# Patient Record
Sex: MALE | Race: WHITE | NOT HISPANIC OR LATINO | URBAN - METROPOLITAN AREA
[De-identification: names, ages, dates, MRNs, and addresses within clinical notes are randomized per-mention and may not be internally consistent; named-entity substitution may affect disease eponyms.]

---

## 2017-02-17 ENCOUNTER — FOLLOW UP ESTABLISHED (OUTPATIENT)
Dept: URBAN - METROPOLITAN AREA CLINIC 51 | Facility: CLINIC | Age: 57
End: 2017-02-17
Payer: COMMERCIAL

## 2017-02-17 PROCEDURE — 92083 EXTENDED VISUAL FIELD XM: CPT | Performed by: OPTOMETRIST

## 2017-02-17 PROCEDURE — 92012 INTRM OPH EXAM EST PATIENT: CPT | Performed by: OPTOMETRIST

## 2017-02-17 RX ORDER — FLUOROMETHOLONE 1 MG/ML
0.1 % SUSPENSION/ DROPS OPHTHALMIC
Qty: 1 | Refills: 0 | Status: INACTIVE
Start: 2017-02-17 | End: 2017-03-10

## 2017-02-17 ASSESSMENT — INTRAOCULAR PRESSURE
OS: 21
OD: 24

## 2017-03-10 ENCOUNTER — FOLLOW UP ESTABLISHED (OUTPATIENT)
Dept: URBAN - METROPOLITAN AREA CLINIC 51 | Facility: CLINIC | Age: 57
End: 2017-03-10
Payer: COMMERCIAL

## 2017-03-10 DIAGNOSIS — H10.013 ACUTE FOLLICULAR CONJUNCTIVITIS, BILATERAL: Primary | ICD-10-CM

## 2017-03-10 PROCEDURE — 92012 INTRM OPH EXAM EST PATIENT: CPT | Performed by: OPTOMETRIST

## 2017-03-10 RX ORDER — LATANOPROST 50 UG/ML
0.005 % SOLUTION OPHTHALMIC
Qty: 3 | Refills: 3 | Status: INACTIVE
Start: 2017-03-10 | End: 2017-09-12

## 2017-03-10 RX ORDER — BRIMONIDINE TARTRATE 2 MG/ML
0.2 % SOLUTION/ DROPS OPHTHALMIC
Qty: 3 | Refills: 3 | Status: INACTIVE
Start: 2017-03-10 | End: 2017-09-12

## 2017-03-10 ASSESSMENT — INTRAOCULAR PRESSURE
OD: 19
OS: 16

## 2017-09-12 ENCOUNTER — FOLLOW UP ESTABLISHED (OUTPATIENT)
Dept: URBAN - METROPOLITAN AREA CLINIC 51 | Facility: CLINIC | Age: 57
End: 2017-09-12
Payer: COMMERCIAL

## 2017-09-12 PROCEDURE — 92012 INTRM OPH EXAM EST PATIENT: CPT | Performed by: OPTOMETRIST

## 2017-09-12 RX ORDER — LATANOPROST 50 UG/ML
0.005 % SOLUTION OPHTHALMIC
Qty: 3 | Refills: 3 | Status: INACTIVE
Start: 2017-09-12 | End: 2018-01-29

## 2017-09-12 RX ORDER — DORZOLAMIDE HYDROCHLORIDE AND TIMOLOL MALEATE 20; 5 MG/ML; MG/ML
SOLUTION OPHTHALMIC
Qty: 3 | Refills: 3 | Status: INACTIVE
Start: 2017-09-12 | End: 2018-01-29

## 2017-09-12 RX ORDER — BRIMONIDINE TARTRATE 2 MG/ML
0.2 % SOLUTION/ DROPS OPHTHALMIC
Qty: 3 | Refills: 3 | Status: INACTIVE
Start: 2017-09-12 | End: 2018-01-29

## 2017-09-12 ASSESSMENT — INTRAOCULAR PRESSURE
OS: 21
OD: 21

## 2018-01-29 ENCOUNTER — FOLLOW UP ESTABLISHED (OUTPATIENT)
Dept: URBAN - METROPOLITAN AREA CLINIC 51 | Facility: CLINIC | Age: 58
End: 2018-01-29
Payer: COMMERCIAL

## 2018-01-29 PROCEDURE — 92083 EXTENDED VISUAL FIELD XM: CPT | Performed by: OPTOMETRIST

## 2018-01-29 PROCEDURE — 92012 INTRM OPH EXAM EST PATIENT: CPT | Performed by: OPTOMETRIST

## 2018-01-29 RX ORDER — BRIMONIDINE TARTRATE 2 MG/ML
0.2 % SOLUTION/ DROPS OPHTHALMIC
Qty: 3 | Refills: 3 | Status: INACTIVE
Start: 2018-01-29 | End: 2019-04-18

## 2018-01-29 RX ORDER — LATANOPROST 50 UG/ML
0.005 % SOLUTION OPHTHALMIC
Qty: 3 | Refills: 3 | Status: INACTIVE
Start: 2018-01-29 | End: 2019-04-18

## 2018-01-29 RX ORDER — DORZOLAMIDE HYDROCHLORIDE AND TIMOLOL MALEATE 20; 5 MG/ML; MG/ML
SOLUTION OPHTHALMIC
Qty: 3 | Refills: 3 | Status: INACTIVE
Start: 2018-01-29 | End: 2019-04-18

## 2018-01-29 ASSESSMENT — INTRAOCULAR PRESSURE
OS: 16
OD: 16

## 2018-02-16 ENCOUNTER — FOLLOW UP ESTABLISHED (OUTPATIENT)
Dept: URBAN - METROPOLITAN AREA CLINIC 51 | Facility: CLINIC | Age: 58
End: 2018-02-16
Payer: COMMERCIAL

## 2018-02-16 DIAGNOSIS — H17.13 CENTRAL CORNEAL OPACITY, BILATERAL: ICD-10-CM

## 2018-02-16 DIAGNOSIS — H40.1131 PRIMARY OPEN-ANGLE GLAUCOMA, BILATERAL, MILD STAGE: Primary | ICD-10-CM

## 2018-02-16 PROCEDURE — 92014 COMPRE OPH EXAM EST PT 1/>: CPT | Performed by: OPTOMETRIST

## 2018-02-16 PROCEDURE — 92015 DETERMINE REFRACTIVE STATE: CPT | Performed by: OPTOMETRIST

## 2018-02-16 ASSESSMENT — INTRAOCULAR PRESSURE
OD: 18
OS: 18

## 2018-02-16 ASSESSMENT — KERATOMETRY
OD: 43.73
OS: 43.61

## 2018-02-16 ASSESSMENT — VISUAL ACUITY
OD: 20/25
OS: 20/25

## 2018-06-21 ENCOUNTER — FOLLOW UP ESTABLISHED (OUTPATIENT)
Dept: URBAN - METROPOLITAN AREA CLINIC 51 | Facility: CLINIC | Age: 58
End: 2018-06-21
Payer: COMMERCIAL

## 2018-06-21 PROCEDURE — 92012 INTRM OPH EXAM EST PATIENT: CPT | Performed by: OPTOMETRIST

## 2018-06-21 PROCEDURE — 92133 CPTRZD OPH DX IMG PST SGM ON: CPT | Performed by: OPTOMETRIST

## 2018-06-21 ASSESSMENT — INTRAOCULAR PRESSURE
OS: 17
OD: 18

## 2018-10-23 ENCOUNTER — FOLLOW UP ESTABLISHED (OUTPATIENT)
Dept: URBAN - METROPOLITAN AREA CLINIC 51 | Facility: CLINIC | Age: 58
End: 2018-10-23
Payer: COMMERCIAL

## 2018-10-23 PROCEDURE — 92083 EXTENDED VISUAL FIELD XM: CPT | Performed by: OPTOMETRIST

## 2018-10-23 PROCEDURE — 92012 INTRM OPH EXAM EST PATIENT: CPT | Performed by: OPTOMETRIST

## 2018-10-23 ASSESSMENT — INTRAOCULAR PRESSURE
OD: 16
OS: 16

## 2019-04-18 ENCOUNTER — FOLLOW UP ESTABLISHED (OUTPATIENT)
Dept: URBAN - METROPOLITAN AREA CLINIC 51 | Facility: CLINIC | Age: 59
End: 2019-04-18
Payer: COMMERCIAL

## 2019-04-18 DIAGNOSIS — H52.4 PRESBYOPIA: ICD-10-CM

## 2019-04-18 PROCEDURE — 92015 DETERMINE REFRACTIVE STATE: CPT | Performed by: OPTOMETRIST

## 2019-04-18 PROCEDURE — 92133 CPTRZD OPH DX IMG PST SGM ON: CPT | Performed by: OPTOMETRIST

## 2019-04-18 PROCEDURE — 92014 COMPRE OPH EXAM EST PT 1/>: CPT | Performed by: OPTOMETRIST

## 2019-04-18 RX ORDER — BRIMONIDINE TARTRATE 2 MG/ML
0.2 % SOLUTION/ DROPS OPHTHALMIC
Qty: 3 | Refills: 1 | Status: INACTIVE
Start: 2019-04-18 | End: 2020-03-17

## 2019-04-18 RX ORDER — DORZOLAMIDE HYDROCHLORIDE AND TIMOLOL MALEATE 20; 5 MG/ML; MG/ML
SOLUTION/ DROPS OPHTHALMIC
Qty: 3 | Refills: 1 | Status: INACTIVE
Start: 2019-04-18 | End: 2020-05-27

## 2019-04-18 RX ORDER — DORZOLAMIDE HYDROCHLORIDE AND TIMOLOL MALEATE 20; 5 MG/ML; MG/ML
SOLUTION/ DROPS OPHTHALMIC
Qty: 3 | Refills: 3 | Status: INACTIVE
Start: 2019-04-18 | End: 2019-04-18

## 2019-04-18 RX ORDER — LATANOPROST 50 UG/ML
0.005 % SOLUTION OPHTHALMIC
Qty: 3 | Refills: 1 | Status: INACTIVE
Start: 2019-04-18 | End: 2019-10-30

## 2019-04-18 RX ORDER — POLYVINYL ALCOHOL, POVIDONE 14; 6 MG/ML; MG/ML
SOLUTION/ DROPS OPHTHALMIC
Qty: 90 | Refills: 12 | Status: INACTIVE
Start: 2019-04-18 | End: 2020-05-27

## 2019-04-18 ASSESSMENT — VISUAL ACUITY
OS: 20/20
OD: 20/25

## 2019-04-18 ASSESSMENT — KERATOMETRY
OS: 43.80
OD: 43.27

## 2019-04-18 ASSESSMENT — INTRAOCULAR PRESSURE
OD: 16
OS: 17

## 2019-09-26 ENCOUNTER — FOLLOW UP ESTABLISHED (OUTPATIENT)
Dept: URBAN - METROPOLITAN AREA CLINIC 51 | Facility: CLINIC | Age: 59
End: 2019-09-26
Payer: COMMERCIAL

## 2019-09-26 PROCEDURE — 92083 EXTENDED VISUAL FIELD XM: CPT | Performed by: OPTOMETRIST

## 2019-09-26 PROCEDURE — 92012 INTRM OPH EXAM EST PATIENT: CPT | Performed by: OPTOMETRIST

## 2019-09-26 ASSESSMENT — INTRAOCULAR PRESSURE
OD: 23
OS: 19
OS: 18

## 2019-10-30 ENCOUNTER — FOLLOW UP ESTABLISHED (OUTPATIENT)
Dept: URBAN - METROPOLITAN AREA CLINIC 51 | Facility: CLINIC | Age: 59
End: 2019-10-30
Payer: COMMERCIAL

## 2019-10-30 PROCEDURE — 92012 INTRM OPH EXAM EST PATIENT: CPT | Performed by: OPTOMETRIST

## 2019-10-30 RX ORDER — BIMATOPROST 0.1 MG/ML
0.01 % SOLUTION/ DROPS OPHTHALMIC
Qty: 3 | Refills: 1 | Status: INACTIVE
Start: 2019-10-30 | End: 2020-03-17

## 2019-10-30 ASSESSMENT — INTRAOCULAR PRESSURE
OD: 23
OD: 20
OS: 18

## 2020-05-27 ENCOUNTER — FOLLOW UP ESTABLISHED (OUTPATIENT)
Dept: URBAN - METROPOLITAN AREA CLINIC 51 | Facility: CLINIC | Age: 60
End: 2020-05-27
Payer: COMMERCIAL

## 2020-05-27 PROCEDURE — 92014 COMPRE OPH EXAM EST PT 1/>: CPT | Performed by: OPTOMETRIST

## 2020-05-27 PROCEDURE — 92083 EXTENDED VISUAL FIELD XM: CPT | Performed by: OPTOMETRIST

## 2020-05-27 PROCEDURE — 92134 CPTRZ OPH DX IMG PST SGM RTA: CPT | Performed by: OPTOMETRIST

## 2020-05-27 PROCEDURE — 92133 CPTRZD OPH DX IMG PST SGM ON: CPT | Performed by: OPTOMETRIST

## 2020-05-27 PROCEDURE — 92015 DETERMINE REFRACTIVE STATE: CPT | Performed by: OPTOMETRIST

## 2020-05-27 RX ORDER — DORZOLAMIDE HYDROCHLORIDE AND TIMOLOL MALEATE 20; 5 MG/ML; MG/ML
SOLUTION/ DROPS OPHTHALMIC
Qty: 3 | Refills: 1 | Status: INACTIVE
Start: 2020-05-27 | End: 2020-09-21

## 2020-05-27 RX ORDER — BIMATOPROST 0.1 MG/ML
0.01 % SOLUTION/ DROPS OPHTHALMIC
Qty: 3 | Refills: 1 | Status: INACTIVE
Start: 2020-05-27 | End: 2020-09-21

## 2020-05-27 RX ORDER — BRIMONIDINE TARTRATE 2 MG/ML
0.2 % SOLUTION/ DROPS OPHTHALMIC
Qty: 3 | Refills: 1 | Status: INACTIVE
Start: 2020-05-27 | End: 2020-09-21

## 2020-05-27 RX ORDER — POLYVINYL ALCOHOL, POVIDONE 14; 6 MG/ML; MG/ML
SOLUTION/ DROPS OPHTHALMIC
Qty: 90 | Refills: 12 | Status: ACTIVE
Start: 2020-05-27

## 2020-05-27 ASSESSMENT — KERATOMETRY
OS: 43.80
OD: 43.13

## 2020-05-27 ASSESSMENT — INTRAOCULAR PRESSURE
OS: 18
OD: 21

## 2020-05-27 ASSESSMENT — VISUAL ACUITY
OD: 20/40
OS: 20/30

## 2020-09-21 ENCOUNTER — FOLLOW UP ESTABLISHED (OUTPATIENT)
Dept: URBAN - METROPOLITAN AREA CLINIC 51 | Facility: CLINIC | Age: 60
End: 2020-09-21
Payer: COMMERCIAL

## 2020-09-21 PROCEDURE — 92012 INTRM OPH EXAM EST PATIENT: CPT | Performed by: OPTOMETRIST

## 2020-09-21 RX ORDER — DORZOLAMIDE HYDROCHLORIDE AND TIMOLOL MALEATE 20; 5 MG/ML; MG/ML
SOLUTION/ DROPS OPHTHALMIC
Qty: 3 | Refills: 1 | Status: INACTIVE
Start: 2020-09-21 | End: 2021-06-04

## 2020-09-21 RX ORDER — BRIMONIDINE TARTRATE 2 MG/ML
0.2 % SOLUTION/ DROPS OPHTHALMIC
Qty: 3 | Refills: 1 | Status: INACTIVE
Start: 2020-09-21 | End: 2021-06-04

## 2020-09-21 RX ORDER — BIMATOPROST 0.1 MG/ML
0.01 % SOLUTION/ DROPS OPHTHALMIC
Qty: 3 | Refills: 1 | Status: INACTIVE
Start: 2020-09-21 | End: 2021-06-04

## 2020-09-21 ASSESSMENT — INTRAOCULAR PRESSURE
OS: 21
OD: 19
OD: 22

## 2021-01-18 ENCOUNTER — FOLLOW UP ESTABLISHED (OUTPATIENT)
Dept: URBAN - METROPOLITAN AREA CLINIC 51 | Facility: CLINIC | Age: 61
End: 2021-01-18
Payer: COMMERCIAL

## 2021-01-18 PROCEDURE — 92134 CPTRZ OPH DX IMG PST SGM RTA: CPT | Performed by: OPTOMETRIST

## 2021-01-18 PROCEDURE — 92012 INTRM OPH EXAM EST PATIENT: CPT | Performed by: OPTOMETRIST

## 2021-01-18 ASSESSMENT — INTRAOCULAR PRESSURE
OD: 34
OS: 16
OD: 30

## 2021-02-23 ENCOUNTER — FOLLOW UP ESTABLISHED (OUTPATIENT)
Dept: URBAN - METROPOLITAN AREA CLINIC 51 | Facility: CLINIC | Age: 61
End: 2021-02-23
Payer: COMMERCIAL

## 2021-02-23 DIAGNOSIS — H04.123 TEAR FILM INSUFFICIENCY OF BILATERAL LACRIMAL GLANDS: ICD-10-CM

## 2021-02-23 PROCEDURE — 92134 CPTRZ OPH DX IMG PST SGM RTA: CPT | Performed by: OPTOMETRIST

## 2021-02-23 PROCEDURE — 92083 EXTENDED VISUAL FIELD XM: CPT | Performed by: OPTOMETRIST

## 2021-02-23 PROCEDURE — 92133 CPTRZD OPH DX IMG PST SGM ON: CPT | Performed by: OPTOMETRIST

## 2021-02-23 PROCEDURE — 92014 COMPRE OPH EXAM EST PT 1/>: CPT | Performed by: OPTOMETRIST

## 2021-02-23 ASSESSMENT — KERATOMETRY
OD: 43.47
OS: 43.88

## 2021-02-23 ASSESSMENT — INTRAOCULAR PRESSURE
OS: 15
OD: 18

## 2021-02-23 ASSESSMENT — VISUAL ACUITY
OD: 20/150
OS: 20/20

## 2021-06-04 ENCOUNTER — OFFICE VISIT (OUTPATIENT)
Dept: URBAN - METROPOLITAN AREA CLINIC 51 | Facility: CLINIC | Age: 61
End: 2021-06-04
Payer: COMMERCIAL

## 2021-06-04 DIAGNOSIS — H02.61 XANTHELASMA OF RIGHT UPPER EYELID: ICD-10-CM

## 2021-06-04 DIAGNOSIS — H25.13 AGE-RELATED NUCLEAR CATARACT, BILATERAL: Primary | ICD-10-CM

## 2021-06-04 DIAGNOSIS — H02.64 XANTHELASMA OF LEFT UPPER EYELID: ICD-10-CM

## 2021-06-04 PROCEDURE — 99214 OFFICE O/P EST MOD 30 MIN: CPT | Performed by: OPTOMETRIST

## 2021-06-04 PROCEDURE — 92134 CPTRZ OPH DX IMG PST SGM RTA: CPT | Performed by: OPTOMETRIST

## 2021-06-04 RX ORDER — BRIMONIDINE TARTRATE 2 MG/ML
0.2 % SOLUTION/ DROPS OPHTHALMIC
Qty: 15 | Refills: 1 | Status: INACTIVE
Start: 2021-06-04 | End: 2022-04-06

## 2021-06-04 RX ORDER — DORZOLAMIDE HYDROCHLORIDE AND TIMOLOL MALEATE 20; 5 MG/ML; MG/ML
SOLUTION/ DROPS OPHTHALMIC
Qty: 15 | Refills: 1 | Status: INACTIVE
Start: 2021-06-04 | End: 2022-04-06

## 2021-06-04 RX ORDER — NETARSUDIL AND LATANOPROST OPHTHALMIC SOLUTION, 0.02%/0.005% .2; .05 MG/ML; MG/ML
SOLUTION/ DROPS OPHTHALMIC; TOPICAL
Qty: 7.5 | Refills: 2 | Status: INACTIVE
Start: 2021-06-04 | End: 2022-04-06

## 2021-06-04 RX ORDER — BIMATOPROST 0.1 MG/ML
0.01 % SOLUTION/ DROPS OPHTHALMIC
Qty: 3 | Refills: 1 | Status: INACTIVE
Start: 2021-06-04 | End: 2021-06-07

## 2021-06-04 ASSESSMENT — INTRAOCULAR PRESSURE
OD: 20
OS: 18

## 2021-06-04 ASSESSMENT — KERATOMETRY
OD: 43.38
OS: 43.55

## 2021-06-04 NOTE — IMPRESSION/PLAN
Impression: Age-related nuclear cataract, bilateral Plan: Discussed cataracts with patient. Discussed treatment options. Surgical treatment is recommended. Surgical risks and benefits discussed. Patient elects surgical treatment. Recommend surgery OU, OD first. standard lens, Aim OD: plano. Aim OS: plano. Glaucoma surgeon recommended (Dr. Gamboa Courser). Needs Retina Clearance prior to cataract sx OU due to moderate to severe myopia OU. Patient will need glasses for all near work, including computer.

## 2021-06-04 NOTE — IMPRESSION/PLAN
Impression: Tear film insufficiency of bilateral lacrimal glands Plan: Recommend patient to use Refresh PF ATs QID or more OU.

## 2021-06-04 NOTE — IMPRESSION/PLAN
Impression: Primary open-angle glaucoma, mild stage, bilateral
*Asymmetric C/D Ratio (OD>OS) *(+)Fhx of glaucoma - Father *PACHS (07/15/15):  [phone] *IOP today 20mmHg OD and 18mmHg with Rocklatan QHS OU, Lumigan QHS OU, Brimonidine TID OU and Cosopt BID OU. Pt reports good adherence and no adverse reactions *OCT RNFL (2/23/21): OD: 61um; abnormal superior/temporal/inferior thinning OS: 80um ; no thinning *OCT RNFL (05/27/2020) OD: 66um; abnormal superior thinning/ borderline inferior, nasal thinning OS: 80um ; no thinning *OCT RNFL (06/21/18) OD: 63 abnormal superior/inferior thinning stable OS: 79 no thinning *OCT RNFL (11/14/2016): superior/inferior thinning; OS: no thinning *HVF 24-2 (02/23/2021) OD: non specific defect OS: full *HVF 24-2 (05/27/2020) OD: full OS: full *HVF 24-2 (10/23/18) OD: fixation loss (2/10) but full OS: full *HVF 24-2 (01/29/18) OU: full *HVF 24-2 (11/14/2016): OD: high FL
  OS: mild central depressions Plan: IOP OD has improved with the addition of Rocklatan QHS OU. PT to cont with Rocklatan QHS OU, Brimonidine TID OU, Cosopt BID and Lumigan QHS OU. OCT MAC performed and reviewed.  
Reviewed MIGS briefly

## 2021-07-29 ENCOUNTER — OFFICE VISIT (OUTPATIENT)
Dept: URBAN - METROPOLITAN AREA CLINIC 44 | Facility: CLINIC | Age: 61
End: 2021-07-29
Payer: COMMERCIAL

## 2021-07-29 DIAGNOSIS — H43.813 VITREOUS DEGENERATION, BILATERAL: Primary | ICD-10-CM

## 2021-07-29 PROCEDURE — 99204 OFFICE O/P NEW MOD 45 MIN: CPT | Performed by: OPHTHALMOLOGY

## 2021-07-29 ASSESSMENT — INTRAOCULAR PRESSURE
OS: 11
OD: 18

## 2021-07-29 NOTE — IMPRESSION/PLAN
Impression: Age-related nuclear cataract, bilateral Plan: visually significant cataracts, no retinal holes, breaks, tears, or detachments. ok to proceed with cataract surgery RTC Cataract surgery

## 2021-09-03 ENCOUNTER — ADULT PHYSICAL (OUTPATIENT)
Dept: URBAN - METROPOLITAN AREA CLINIC 44 | Facility: CLINIC | Age: 61
End: 2021-09-03
Payer: COMMERCIAL

## 2021-09-03 DIAGNOSIS — Z01.818 ENCOUNTER FOR OTHER PREPROCEDURAL EXAMINATION: Primary | ICD-10-CM

## 2021-09-03 PROCEDURE — 76519 ECHO EXAM OF EYE: CPT | Performed by: OPHTHALMOLOGY

## 2021-09-03 PROCEDURE — 99203 OFFICE O/P NEW LOW 30 MIN: CPT | Performed by: PHYSICIAN ASSISTANT

## 2021-09-03 RX ORDER — ATENOLOL 50 MG/1
50 MG TABLET ORAL
Qty: 0 | Refills: 0 | Status: ACTIVE
Start: 2021-09-03

## 2021-09-03 ASSESSMENT — PACHYMETRY
OS: 3.75
OS: 26.86
OD: 3.92
OD: 37.39

## 2021-09-09 ENCOUNTER — PRE-OPERATIVE VISIT (OUTPATIENT)
Dept: URBAN - METROPOLITAN AREA CLINIC 44 | Facility: CLINIC | Age: 61
End: 2021-09-09
Payer: COMMERCIAL

## 2021-09-09 DIAGNOSIS — H25.12 AGE-RELATED NUCLEAR CATARACT, LEFT EYE: ICD-10-CM

## 2021-09-09 DIAGNOSIS — H52.203 BILATERAL ASTIGMATISM: ICD-10-CM

## 2021-09-09 DIAGNOSIS — H52.13 MYOPIA, BILATERAL: ICD-10-CM

## 2021-09-09 PROCEDURE — 76514 ECHO EXAM OF EYE THICKNESS: CPT | Performed by: OPHTHALMOLOGY

## 2021-09-09 PROCEDURE — 99214 OFFICE O/P EST MOD 30 MIN: CPT | Performed by: OPHTHALMOLOGY

## 2021-09-09 PROCEDURE — 92083 EXTENDED VISUAL FIELD XM: CPT | Performed by: OPHTHALMOLOGY

## 2021-09-09 PROCEDURE — 92136 OPHTHALMIC BIOMETRY: CPT | Performed by: OPHTHALMOLOGY

## 2021-09-09 PROCEDURE — 92020 GONIOSCOPY: CPT | Performed by: OPHTHALMOLOGY

## 2021-09-09 ASSESSMENT — INTRAOCULAR PRESSURE
OD: 20
OS: 19

## 2021-09-09 NOTE — IMPRESSION/PLAN
Impression: Primary open-angle glaucoma, mild stage, bilateral
POSITIVE Family Hx of Glaucoma (father) DENIES Sulfa Allergy, Heart or Lung Problems, Sleep Apnea, Hx of Migraines Plan: PLAN: On Rocklatan QHS OU, Brimonidine TID OU, Cosopt BID OU, and Latanoprost QHS OU, test reviewed, IOP is stable and so may proceed with cataract surgery with MIGS  (KDB 1st, Istent at backup) and Discussed glaucoma may limit vision after surgery, may proceed with migs   in hopes of better iop control - understands does not eliminate meds. Discussed possible unmasking of scotoma after surgery. Recommend patient stop Latanoprost and continue other meds as prescribed. TESTS: Reviewed 09/09/21 Pachymetry - OD: average thickness and average risk; OS: average thickness and average risk 09/09/21 Visual Field - OD: Good-central depression - poor vision; OS: Good-overall full Discussed Glaucoma diagnosis in detail with patient. Emphasized and explain complaince. poor compliance can lead to Blindness.

## 2021-09-09 NOTE — IMPRESSION/PLAN
Impression: Central corneal opacity, bilateral Plan: FB scars OU;  Discussed with patient, understands this may limit vision after surgery.

## 2021-09-09 NOTE — IMPRESSION/PLAN
Impression: Age-related nuclear cataract, left eye: H25.12. Plan: PLAN: OK to proceed with second cataract surgery after first eye cleared.

## 2021-09-09 NOTE — IMPRESSION/PLAN
Impression: Age-related nuclear cataract, bilateral Plan: Discussed cataract diagnosis with the patient. Risks and benefits of surgical treatment were discussed and understood. Patient desires surgical treatment. Discussed lens options with pt and pt is ok with wearing glasses after surgery. Patient desires surgery to proceed with surgery OU, OD First. Both eyes examined, medically necessary due to impact in activities of daily living. Discussed loss of myopia and patient wishes to proceed with near aim. (( AIM -2.00 OU: INJECTABLE OU (TRIMOXI (va patient) - subconj only ), Epi, possible stretch, ORA OU; NO LRI OU: minimal astig; MIGS OU (KDB 1st, Istent at backup) do not tape head, Glaucoma coverage; retina clearance done - high myopia )) Discussed with pt limitations of MIGS device and it does not replace  Glaucoma eye drops and would have to continue treatment and would still need glasses after surgery. Understands possible use of iris stretch. Understands higher risk of complication and delayed healing due to dense cataract.

## 2021-09-09 NOTE — IMPRESSION/PLAN
Impression: Tear film insufficiency of bilateral lacrimal glands Plan: ATs;  Discussed with patient, understands this may limit vision after surgery.

## 2021-09-09 NOTE — IMPRESSION/PLAN
Impression: Xanthelasma of left upper eyelid Plan: On cholesterol med. Discussed with patient, understands this may limit vision after surgery.

## 2021-09-09 NOTE — IMPRESSION/PLAN
Impression: Bilateral astigmatism: H52.203. Plan: Discussed with patient that he will need glasses after surgery. Discussed with patient, understands this may limit vision after surgery. Also discussed unmasking of astigmatism.

## 2021-09-16 ENCOUNTER — SURGERY (OUTPATIENT)
Dept: URBAN - METROPOLITAN AREA SURGERY 19 | Facility: SURGERY | Age: 61
End: 2021-09-16
Payer: COMMERCIAL

## 2021-09-17 ENCOUNTER — POST-OPERATIVE VISIT (OUTPATIENT)
Dept: URBAN - METROPOLITAN AREA CLINIC 44 | Facility: CLINIC | Age: 61
End: 2021-09-17
Payer: COMMERCIAL

## 2021-09-17 PROCEDURE — 99024 POSTOP FOLLOW-UP VISIT: CPT | Performed by: OPTOMETRIST

## 2021-09-17 ASSESSMENT — INTRAOCULAR PRESSURE: OD: 30

## 2021-09-17 NOTE — IMPRESSION/PLAN
Impression: S/P Cataract Extraction by phacoemulsification with IOL placement; ORA; KDB/Goniotomy OD - 1 Day. Encounter for surgical aftercare following surgery on a sense organ  Z48.810. Plan: Post-op instructions reviewed.

## 2021-09-24 ENCOUNTER — POST-OPERATIVE VISIT (OUTPATIENT)
Dept: URBAN - METROPOLITAN AREA CLINIC 44 | Facility: CLINIC | Age: 61
End: 2021-09-24
Payer: COMMERCIAL

## 2021-09-24 PROCEDURE — 99024 POSTOP FOLLOW-UP VISIT: CPT | Performed by: OPTOMETRIST

## 2021-09-24 ASSESSMENT — INTRAOCULAR PRESSURE
OS: 15
OD: 15

## 2021-09-24 ASSESSMENT — VISUAL ACUITY
OS: 20/30
OD: 20/20

## 2021-09-24 NOTE — IMPRESSION/PLAN
Impression: S/P Cataract Extraction by phacoemulsification with IOL placement; ORA; KDB/Goniotomy OD - 8 Days. Encounter for surgical aftercare following surgery on a sense organ  Z48.810. Plan: Okay for surgery second eye.   Temporary rx today

## 2021-09-30 ENCOUNTER — SURGERY (OUTPATIENT)
Dept: URBAN - METROPOLITAN AREA SURGERY 19 | Facility: SURGERY | Age: 61
End: 2021-09-30
Payer: COMMERCIAL

## 2021-10-01 ENCOUNTER — POST-OPERATIVE VISIT (OUTPATIENT)
Dept: URBAN - METROPOLITAN AREA CLINIC 44 | Facility: CLINIC | Age: 61
End: 2021-10-01

## 2021-10-01 PROCEDURE — 99024 POSTOP FOLLOW-UP VISIT: CPT | Performed by: OPTOMETRIST

## 2021-10-01 ASSESSMENT — INTRAOCULAR PRESSURE: OS: 15

## 2021-10-28 ENCOUNTER — POST-OPERATIVE VISIT (OUTPATIENT)
Dept: URBAN - METROPOLITAN AREA CLINIC 44 | Facility: CLINIC | Age: 61
End: 2021-10-28
Payer: COMMERCIAL

## 2021-10-28 DIAGNOSIS — Z48.810 ENCOUNTER FOR SURGICAL AFTERCARE FOLLOWING SURGERY ON A SENSE ORGAN: Primary | ICD-10-CM

## 2021-10-28 PROCEDURE — 99024 POSTOP FOLLOW-UP VISIT: CPT | Performed by: OPTOMETRIST

## 2021-10-28 ASSESSMENT — INTRAOCULAR PRESSURE
OS: 19
OD: 19

## 2021-10-28 ASSESSMENT — VISUAL ACUITY
OS: 20/20
OD: 20/30

## 2022-04-25 ENCOUNTER — OFFICE VISIT (OUTPATIENT)
Dept: URBAN - METROPOLITAN AREA CLINIC 43 | Facility: CLINIC | Age: 62
End: 2022-04-25
Payer: COMMERCIAL

## 2022-04-25 DIAGNOSIS — H40.1132 PRIMARY OPEN-ANGLE GLAUCOMA, BILATERAL, MODERATE STAGE: ICD-10-CM

## 2022-04-25 DIAGNOSIS — H40.1131 PRIMARY OPEN-ANGLE GLAUCOMA, BILATERAL, MILD STAGE: Primary | ICD-10-CM

## 2022-04-25 PROCEDURE — 92083 EXTENDED VISUAL FIELD XM: CPT | Performed by: OPTOMETRIST

## 2022-04-25 PROCEDURE — 92133 CPTRZD OPH DX IMG PST SGM ON: CPT | Performed by: OPTOMETRIST

## 2022-04-25 PROCEDURE — 99213 OFFICE O/P EST LOW 20 MIN: CPT | Performed by: OPTOMETRIST

## 2022-04-25 ASSESSMENT — INTRAOCULAR PRESSURE
OD: 22
OS: 18
OD: 21

## 2022-04-25 NOTE — IMPRESSION/PLAN
Impression: Primary open-angle glaucoma, bilateral, moderate stage: G47.6043. POSITIVE Family Hx of Glaucoma (father)
pachs [phone] DENIES Sulfa Allergy, Heart or Lung Problems, Sleep Apnea, Hx of Migraines Plan: PLAN: On Latanoprost QHS OU, Brimonidine TID OU, Cosopt BID OU (s/p goniotomy OU 2021) IOP today:  21/18 HVF today: reliable, OD: few sup nasal step defects, few inf arcuate defects, OS: few nasal defects OCT RNFL today: OD: sup and inf thinning (63) moderate to severe but stable, OS: bdl sup thin
cont above gtts, IOP check in 3 months, consider switching latanoprost to rocklatan if iOP remains low 20s.

## 2022-07-25 ENCOUNTER — OFFICE VISIT (OUTPATIENT)
Dept: URBAN - METROPOLITAN AREA CLINIC 43 | Facility: CLINIC | Age: 62
End: 2022-07-25
Payer: COMMERCIAL

## 2022-07-25 DIAGNOSIS — H40.1132 PRIMARY OPEN-ANGLE GLAUCOMA, BILATERAL, MODERATE STAGE: Primary | ICD-10-CM

## 2022-07-25 PROCEDURE — 99213 OFFICE O/P EST LOW 20 MIN: CPT | Performed by: OPTOMETRIST

## 2022-07-25 ASSESSMENT — INTRAOCULAR PRESSURE
OD: 17
OS: 15
OD: 18
OS: 14

## 2022-07-25 NOTE — IMPRESSION/PLAN
Impression: Primary open-angle glaucoma, bilateral, moderate stage: W29.8663. POSITIVE Family Hx of Glaucoma (father)
pachs [phone]/688 DENIES Sulfa Allergy, Heart or Lung Problems, Sleep Apnea, Hx of Migraines Plan: PLAN: On Latanoprost QHS OU, Brimonidine TID OU, Cosopt BID OU (s/p goniotomy OU 2021) IOP today:  17,15 HVF 04/25/22 reliable, OD: few sup nasal step defects, few inf arcuate defects, OS: few nasal defects OCT RNFL 04/25/22: OD: sup and inf thinning (63) moderate to severe but stable, OS: bdl sup thin
cont above gtts RTC 4 months for CE

## 2022-11-21 ENCOUNTER — OFFICE VISIT (OUTPATIENT)
Dept: URBAN - METROPOLITAN AREA CLINIC 43 | Facility: CLINIC | Age: 62
End: 2022-11-21
Payer: COMMERCIAL

## 2022-11-21 DIAGNOSIS — H40.1132 PRIMARY OPEN-ANGLE GLAUCOMA, BILATERAL, MODERATE STAGE: Primary | ICD-10-CM

## 2022-11-21 DIAGNOSIS — H04.123 DRY EYE SYNDROME OF BILATERAL LACRIMAL GLANDS: ICD-10-CM

## 2022-11-21 DIAGNOSIS — H43.813 VITREOUS DEGENERATION, BILATERAL: ICD-10-CM

## 2022-11-21 PROCEDURE — 92134 CPTRZ OPH DX IMG PST SGM RTA: CPT | Performed by: OPTOMETRIST

## 2022-11-21 PROCEDURE — 99214 OFFICE O/P EST MOD 30 MIN: CPT | Performed by: OPTOMETRIST

## 2022-11-21 ASSESSMENT — KERATOMETRY
OS: 44.38
OD: 44.00

## 2022-11-21 ASSESSMENT — INTRAOCULAR PRESSURE
OD: 17
OS: 15
OD: 16

## 2022-11-21 ASSESSMENT — VISUAL ACUITY
OD: 20/25
OS: 20/20

## 2022-11-21 NOTE — IMPRESSION/PLAN
Impression: Primary open-angle glaucoma, bilateral, moderate stage: T03.5451. POSITIVE Family Hx of Glaucoma (father)
pachs [phone]/732 DENIES Sulfa Allergy, Heart or Lung Problems, Sleep Apnea, Hx of Migraines Plan: PLAN: On Latanoprost QHS OU, Brimonidine TID OU, Cosopt BID OU (s/p goniotomy OU 2021) IOP today:  17/15 HVF 04/25/22 reliable, OD: few sup nasal step defects, few inf arcuate defects, OS: few nasal defects OCT RNFL 04/25/22: OD: sup and inf thinning (63) moderate to severe but stable, OS: bdl sup thin
cont above gtts, return 5-6 months for IOP/VF/RNFL

## 2023-04-17 ENCOUNTER — OFFICE VISIT (OUTPATIENT)
Dept: URBAN - METROPOLITAN AREA CLINIC 43 | Facility: CLINIC | Age: 63
End: 2023-04-17
Payer: COMMERCIAL

## 2023-04-17 DIAGNOSIS — H40.1132 PRIMARY OPEN-ANGLE GLAUCOMA, BILATERAL, MODERATE STAGE: Primary | ICD-10-CM

## 2023-04-17 PROCEDURE — 92133 CPTRZD OPH DX IMG PST SGM ON: CPT | Performed by: OPTOMETRIST

## 2023-04-17 PROCEDURE — 99213 OFFICE O/P EST LOW 20 MIN: CPT | Performed by: OPTOMETRIST

## 2023-04-17 PROCEDURE — 92083 EXTENDED VISUAL FIELD XM: CPT | Performed by: OPTOMETRIST

## 2023-04-17 RX ORDER — LATANOPROST 50 UG/ML
0.005 % SOLUTION OPHTHALMIC
Qty: 7.5 | Refills: 1 | Status: ACTIVE
Start: 2023-04-17

## 2023-04-17 RX ORDER — BRIMONIDINE TARTRATE 2 MG/ML
0.2 % SOLUTION/ DROPS OPHTHALMIC
Qty: 15 | Refills: 1 | Status: ACTIVE
Start: 2023-04-17

## 2023-04-17 RX ORDER — DORZOLAMIDE HYDROCHLORIDE AND TIMOLOL MALEATE 20; 5 MG/ML; MG/ML
SOLUTION/ DROPS OPHTHALMIC
Qty: 15 | Refills: 1 | Status: ACTIVE
Start: 2023-04-17

## 2023-04-17 ASSESSMENT — INTRAOCULAR PRESSURE
OD: 20
OS: 14

## 2023-04-17 NOTE — IMPRESSION/PLAN
Impression: Primary open-angle glaucoma, bilateral, moderate stage: V97.2580. POSITIVE Family Hx of Glaucoma (father)
pachs [phone]/138 DENIES Sulfa Allergy, Heart or Lung Problems, Sleep Apnea, Hx of Migraines Plan: PLAN: On Latanoprost QHS OU, Brimonidine TID OU, Cosopt BID OU (s/p goniotomy OU 2021) IOP today:  20/14 on above gtts HVF today: reliable, OD: few scattered defects, OS: mostly clear field OCT RNFL today: OD: sup and inf thin, 65, OS: no thinning 
cont above gtts, IOP suboptimal OD, but stable testing OU
return 6 months for CE, if IOP remains 20 or above, switch/add gtts

## 2023-10-17 ENCOUNTER — OFFICE VISIT (OUTPATIENT)
Dept: URBAN - METROPOLITAN AREA CLINIC 43 | Facility: CLINIC | Age: 63
End: 2023-10-17
Payer: COMMERCIAL

## 2023-10-17 DIAGNOSIS — H04.123 DRY EYE SYNDROME OF BILATERAL LACRIMAL GLANDS: ICD-10-CM

## 2023-10-17 DIAGNOSIS — H43.813 VITREOUS DEGENERATION, BILATERAL: ICD-10-CM

## 2023-10-17 DIAGNOSIS — H40.1132 PRIMARY OPEN-ANGLE GLAUCOMA, BILATERAL, MODERATE STAGE: Primary | ICD-10-CM

## 2023-10-17 PROCEDURE — 99214 OFFICE O/P EST MOD 30 MIN: CPT | Performed by: OPTOMETRIST

## 2023-10-17 PROCEDURE — 92134 CPTRZ OPH DX IMG PST SGM RTA: CPT | Performed by: OPTOMETRIST

## 2023-10-17 RX ORDER — BRIMONIDINE TARTRATE 2 MG/ML
0.2 % SOLUTION/ DROPS OPHTHALMIC
Qty: 10 | Refills: 5 | Status: ACTIVE
Start: 2023-10-17

## 2023-10-17 RX ORDER — DORZOLAMIDE HYDROCHLORIDE AND TIMOLOL MALEATE 20; 5 MG/ML; MG/ML
SOLUTION/ DROPS OPHTHALMIC
Qty: 10 | Refills: 5 | Status: ACTIVE
Start: 2023-10-17

## 2023-10-17 RX ORDER — LATANOPROST 50 UG/ML
0.005 % SOLUTION OPHTHALMIC
Qty: 7.5 | Refills: 1 | Status: ACTIVE
Start: 2023-10-17

## 2023-10-17 ASSESSMENT — VISUAL ACUITY
OS: 20/20
OD: 20/20

## 2023-10-17 ASSESSMENT — INTRAOCULAR PRESSURE
OS: 14
OD: 16

## 2023-10-17 ASSESSMENT — KERATOMETRY
OS: 44.13
OD: 43.50

## 2024-04-17 ENCOUNTER — OFFICE VISIT (OUTPATIENT)
Dept: URBAN - METROPOLITAN AREA CLINIC 43 | Facility: CLINIC | Age: 64
End: 2024-04-17
Payer: COMMERCIAL

## 2024-04-17 DIAGNOSIS — H40.1132 PRIMARY OPEN-ANGLE GLAUCOMA, BILATERAL, MODERATE STAGE: Primary | ICD-10-CM

## 2024-04-17 PROCEDURE — 92020 GONIOSCOPY: CPT | Performed by: OPTOMETRIST

## 2024-04-17 PROCEDURE — 92083 EXTENDED VISUAL FIELD XM: CPT | Performed by: OPTOMETRIST

## 2024-04-17 PROCEDURE — 92133 CPTRZD OPH DX IMG PST SGM ON: CPT | Performed by: OPTOMETRIST

## 2024-04-17 PROCEDURE — 99214 OFFICE O/P EST MOD 30 MIN: CPT | Performed by: OPTOMETRIST

## 2024-04-17 RX ORDER — ACETAZOLAMIDE 250 MG/1
250 MG TABLET ORAL
Qty: 30 | Refills: 0 | Status: INACTIVE
Start: 2024-04-17 | End: 2024-04-18

## 2024-04-17 ASSESSMENT — INTRAOCULAR PRESSURE
OD: 42
OS: 19
OS: 22
OD: 46

## 2024-04-18 ENCOUNTER — OFFICE VISIT (OUTPATIENT)
Dept: URBAN - METROPOLITAN AREA CLINIC 56 | Facility: LOCATION | Age: 64
End: 2024-04-18
Payer: COMMERCIAL

## 2024-04-18 DIAGNOSIS — H40.1121 PRIMARY OPEN-ANGLE GLAUCOMA, MILD STAGE, LEFT EYE: ICD-10-CM

## 2024-04-18 DIAGNOSIS — H40.1113 PRIMARY OPEN-ANGLE GLAUCOMA, SEVERE STAGE, RIGHT EYE: Primary | ICD-10-CM

## 2024-04-18 PROCEDURE — 99214 OFFICE O/P EST MOD 30 MIN: CPT | Performed by: STUDENT IN AN ORGANIZED HEALTH CARE EDUCATION/TRAINING PROGRAM

## 2024-04-18 PROCEDURE — 92020 GONIOSCOPY: CPT | Performed by: STUDENT IN AN ORGANIZED HEALTH CARE EDUCATION/TRAINING PROGRAM

## 2024-04-18 RX ORDER — OFLOXACIN 3 MG/ML
0.3 % SOLUTION/ DROPS OPHTHALMIC
Qty: 5 | Refills: 0 | Status: ACTIVE
Start: 2024-04-18

## 2024-04-18 RX ORDER — PREDNISOLONE ACETATE 10 MG/ML
1 % SUSPENSION/ DROPS OPHTHALMIC
Qty: 10 | Refills: 3 | Status: ACTIVE
Start: 2024-04-18

## 2024-04-18 RX ORDER — ACETAZOLAMIDE 250 MG/1
250 MG TABLET ORAL
Qty: 90 | Refills: 0 | Status: ACTIVE
Start: 2024-04-18

## 2024-04-23 ENCOUNTER — OFFICE VISIT (OUTPATIENT)
Dept: URBAN - METROPOLITAN AREA CLINIC 33 | Facility: CLINIC | Age: 64
End: 2024-04-23
Payer: COMMERCIAL

## 2024-04-23 DIAGNOSIS — H40.1113 PRIMARY OPEN-ANGLE GLAUCOMA, SEVERE STAGE, RIGHT EYE: Primary | ICD-10-CM

## 2024-04-23 PROCEDURE — 99214 OFFICE O/P EST MOD 30 MIN: CPT | Performed by: STUDENT IN AN ORGANIZED HEALTH CARE EDUCATION/TRAINING PROGRAM

## 2024-04-23 RX ORDER — ACETAZOLAMIDE 250 MG/1
250 MG TABLET ORAL
Qty: 90 | Refills: 0 | Status: ACTIVE
Start: 2024-04-23

## 2024-04-23 RX ORDER — LATANOPROST 50 UG/ML
0.005 % SOLUTION OPHTHALMIC
Qty: 7.5 | Refills: 1 | Status: ACTIVE
Start: 2024-04-23

## 2024-04-23 RX ORDER — ACETAZOLAMIDE 250 MG/1
250 MG TABLET ORAL
Qty: 90 | Refills: 0 | Status: INACTIVE
Start: 2024-04-23 | End: 2024-04-23

## 2024-04-23 RX ORDER — OFLOXACIN 3 MG/ML
0.3 % SOLUTION/ DROPS OPHTHALMIC
Qty: 5 | Refills: 0 | Status: INACTIVE
Start: 2024-04-23 | End: 2024-04-23

## 2024-04-23 RX ORDER — BRIMONIDINE TARTRATE 2 MG/ML
0.2 % SOLUTION/ DROPS OPHTHALMIC
Qty: 10 | Refills: 5 | Status: ACTIVE
Start: 2024-04-23

## 2024-04-23 RX ORDER — POLYVINYL ALCOHOL, POVIDONE 14; 6 MG/ML; MG/ML
SOLUTION/ DROPS OPHTHALMIC
Qty: 90 | Refills: 12 | Status: ACTIVE
Start: 2024-04-23

## 2024-04-23 RX ORDER — OFLOXACIN 3 MG/ML
0.3 % SOLUTION/ DROPS OPHTHALMIC
Qty: 5 | Refills: 0 | Status: ACTIVE
Start: 2024-04-23

## 2024-04-23 RX ORDER — PREDNISOLONE ACETATE 10 MG/ML
1 % SUSPENSION/ DROPS OPHTHALMIC
Qty: 10 | Refills: 3 | Status: ACTIVE
Start: 2024-04-23

## 2024-04-23 RX ORDER — DORZOLAMIDE HYDROCHLORIDE AND TIMOLOL MALEATE 20; 5 MG/ML; MG/ML
SOLUTION/ DROPS OPHTHALMIC
Qty: 10 | Refills: 5 | Status: ACTIVE
Start: 2024-04-23

## 2024-04-23 ASSESSMENT — INTRAOCULAR PRESSURE
OS: 12
OD: 23
OS: 15
OD: 28

## 2024-05-09 ENCOUNTER — POST-OPERATIVE VISIT (OUTPATIENT)
Dept: URBAN - METROPOLITAN AREA CLINIC 43 | Facility: CLINIC | Age: 64
End: 2024-05-09

## 2024-05-09 DIAGNOSIS — Z48.810 ENCOUNTER FOR SURGICAL AFTERCARE FOLLOWING SURGERY ON A SENSE ORGAN: Primary | ICD-10-CM

## 2024-05-09 PROCEDURE — 99024 POSTOP FOLLOW-UP VISIT: CPT | Performed by: OPTOMETRIST

## 2024-05-09 ASSESSMENT — INTRAOCULAR PRESSURE
OD: 16
OD: 17

## 2024-05-16 ENCOUNTER — POST-OPERATIVE VISIT (OUTPATIENT)
Dept: URBAN - METROPOLITAN AREA CLINIC 43 | Facility: CLINIC | Age: 64
End: 2024-05-16
Payer: COMMERCIAL

## 2024-05-16 DIAGNOSIS — Z48.810 ENCOUNTER FOR SURGICAL AFTERCARE FOLLOWING SURGERY ON A SENSE ORGAN: Primary | ICD-10-CM

## 2024-05-16 PROCEDURE — 99024 POSTOP FOLLOW-UP VISIT: CPT | Performed by: OPTOMETRIST

## 2024-05-16 ASSESSMENT — INTRAOCULAR PRESSURE
OS: 8
OD: 15
OD: 14

## 2024-05-30 ENCOUNTER — POST-OPERATIVE VISIT (OUTPATIENT)
Dept: URBAN - METROPOLITAN AREA CLINIC 43 | Facility: CLINIC | Age: 64
End: 2024-05-30

## 2024-05-30 DIAGNOSIS — Z48.810 ENCOUNTER FOR SURGICAL AFTERCARE FOLLOWING SURGERY ON A SENSE ORGAN: Primary | ICD-10-CM

## 2024-05-30 PROCEDURE — 99024 POSTOP FOLLOW-UP VISIT: CPT | Performed by: OPTOMETRIST

## 2024-05-30 RX ORDER — ACETAZOLAMIDE 250 MG/1
250 MG TABLET ORAL
Qty: 90 | Refills: 0 | Status: ACTIVE
Start: 2024-05-30

## 2024-05-30 ASSESSMENT — INTRAOCULAR PRESSURE
OD: 30
OS: 15

## 2024-06-04 ENCOUNTER — POST-OPERATIVE VISIT (OUTPATIENT)
Dept: URBAN - METROPOLITAN AREA CLINIC 43 | Facility: CLINIC | Age: 64
End: 2024-06-04

## 2024-06-04 DIAGNOSIS — Z48.810 ENCOUNTER FOR SURGICAL AFTERCARE FOLLOWING SURGERY ON A SENSE ORGAN: Primary | ICD-10-CM

## 2024-06-04 PROCEDURE — 99024 POSTOP FOLLOW-UP VISIT: CPT | Performed by: OPTOMETRIST

## 2024-06-04 ASSESSMENT — INTRAOCULAR PRESSURE
OD: 30
OS: 15
OD: 24

## 2024-06-04 ASSESSMENT — KERATOMETRY
OD: 43.63
OS: 43.75

## 2024-06-11 ENCOUNTER — OFFICE VISIT (OUTPATIENT)
Dept: URBAN - METROPOLITAN AREA CLINIC 56 | Facility: LOCATION | Age: 64
End: 2024-06-11
Payer: COMMERCIAL

## 2024-06-11 DIAGNOSIS — H40.1113 PRIMARY OPEN-ANGLE GLAUCOMA, SEVERE STAGE, RIGHT EYE: Primary | ICD-10-CM

## 2024-06-11 PROCEDURE — 99024 POSTOP FOLLOW-UP VISIT: CPT | Performed by: STUDENT IN AN ORGANIZED HEALTH CARE EDUCATION/TRAINING PROGRAM

## 2024-06-11 ASSESSMENT — INTRAOCULAR PRESSURE
OD: 31
OS: 15

## 2024-06-19 ENCOUNTER — POST-OPERATIVE VISIT (OUTPATIENT)
Dept: URBAN - METROPOLITAN AREA CLINIC 51 | Facility: CLINIC | Age: 64
End: 2024-06-19
Payer: COMMERCIAL

## 2024-06-19 DIAGNOSIS — H40.1113 PRIMARY OPEN-ANGLE GLAUCOMA, SEVERE STAGE, RIGHT EYE: Primary | ICD-10-CM

## 2024-06-19 PROCEDURE — 99024 POSTOP FOLLOW-UP VISIT: CPT | Performed by: STUDENT IN AN ORGANIZED HEALTH CARE EDUCATION/TRAINING PROGRAM

## 2024-06-19 ASSESSMENT — INTRAOCULAR PRESSURE
OD: 24
OS: 14

## 2024-06-25 ENCOUNTER — POST-OPERATIVE VISIT (OUTPATIENT)
Dept: URBAN - METROPOLITAN AREA CLINIC 56 | Facility: LOCATION | Age: 64
End: 2024-06-25
Payer: COMMERCIAL

## 2024-06-25 DIAGNOSIS — H40.1113 PRIMARY OPEN-ANGLE GLAUCOMA, SEVERE STAGE, RIGHT EYE: Primary | ICD-10-CM

## 2024-06-25 PROCEDURE — 99214 OFFICE O/P EST MOD 30 MIN: CPT | Performed by: STUDENT IN AN ORGANIZED HEALTH CARE EDUCATION/TRAINING PROGRAM

## 2024-06-25 ASSESSMENT — INTRAOCULAR PRESSURE
OS: 13
OD: 22

## 2024-07-31 ENCOUNTER — SURGERY (OUTPATIENT)
Dept: URBAN - METROPOLITAN AREA SURGERY 28 | Facility: LOCATION | Age: 64
End: 2024-07-31
Payer: COMMERCIAL

## 2024-07-31 PROCEDURE — 66184 REVISION OF AQUEOUS SHUNT: CPT | Performed by: STUDENT IN AN ORGANIZED HEALTH CARE EDUCATION/TRAINING PROGRAM

## 2024-08-01 ENCOUNTER — POST-OPERATIVE VISIT (OUTPATIENT)
Dept: URBAN - METROPOLITAN AREA CLINIC 43 | Facility: CLINIC | Age: 64
End: 2024-08-01

## 2024-08-01 DIAGNOSIS — Z48.810 ENCOUNTER FOR SURGICAL AFTERCARE FOLLOWING SURGERY ON A SENSE ORGAN: Primary | ICD-10-CM

## 2024-08-01 PROCEDURE — 99024 POSTOP FOLLOW-UP VISIT: CPT | Performed by: OPTOMETRIST

## 2024-08-01 RX ORDER — ACETAZOLAMIDE 250 MG/1
250 MG TABLET ORAL
Qty: 120 | Refills: 0 | Status: ACTIVE
Start: 2024-08-01

## 2024-08-01 ASSESSMENT — INTRAOCULAR PRESSURE
OD: 27
OD: 22

## 2024-08-08 ENCOUNTER — POST-OPERATIVE VISIT (OUTPATIENT)
Dept: URBAN - METROPOLITAN AREA CLINIC 43 | Facility: CLINIC | Age: 64
End: 2024-08-08
Payer: COMMERCIAL

## 2024-08-08 DIAGNOSIS — Z48.810 ENCOUNTER FOR SURGICAL AFTERCARE FOLLOWING SURGERY ON A SENSE ORGAN: Primary | ICD-10-CM

## 2024-08-08 PROCEDURE — 99024 POSTOP FOLLOW-UP VISIT: CPT | Performed by: OPTOMETRIST

## 2024-08-08 ASSESSMENT — INTRAOCULAR PRESSURE
OS: 12
OD: 21
OD: 18

## 2024-08-29 ENCOUNTER — OFFICE VISIT (OUTPATIENT)
Dept: URBAN - METROPOLITAN AREA CLINIC 56 | Facility: LOCATION | Age: 64
End: 2024-08-29
Payer: COMMERCIAL

## 2024-08-29 DIAGNOSIS — Z48.810 ENCOUNTER FOR SURGICAL AFTERCARE FOLLOWING SURGERY ON A SENSE ORGAN: Primary | ICD-10-CM

## 2024-08-29 PROCEDURE — 99024 POSTOP FOLLOW-UP VISIT: CPT | Performed by: STUDENT IN AN ORGANIZED HEALTH CARE EDUCATION/TRAINING PROGRAM

## 2024-08-29 RX ORDER — ACETAZOLAMIDE 250 MG/1
250 MG TABLET ORAL
Qty: 120 | Refills: 0 | Status: ACTIVE
Start: 2024-08-29

## 2024-08-29 ASSESSMENT — INTRAOCULAR PRESSURE
OD: 18
OS: 16

## 2024-10-17 ENCOUNTER — OFFICE VISIT (OUTPATIENT)
Dept: URBAN - METROPOLITAN AREA CLINIC 56 | Facility: LOCATION | Age: 64
End: 2024-10-17
Payer: COMMERCIAL

## 2024-10-17 DIAGNOSIS — H40.1121 PRIMARY OPEN-ANGLE GLAUCOMA, MILD STAGE, LEFT EYE: ICD-10-CM

## 2024-10-17 DIAGNOSIS — H40.1113 PRIMARY OPEN-ANGLE GLAUCOMA, SEVERE STAGE, RIGHT EYE: Primary | ICD-10-CM

## 2024-10-17 PROCEDURE — 99214 OFFICE O/P EST MOD 30 MIN: CPT | Performed by: STUDENT IN AN ORGANIZED HEALTH CARE EDUCATION/TRAINING PROGRAM

## 2024-10-17 PROCEDURE — 92133 CPTRZD OPH DX IMG PST SGM ON: CPT | Performed by: STUDENT IN AN ORGANIZED HEALTH CARE EDUCATION/TRAINING PROGRAM

## 2024-10-17 PROCEDURE — 92083 EXTENDED VISUAL FIELD XM: CPT | Performed by: STUDENT IN AN ORGANIZED HEALTH CARE EDUCATION/TRAINING PROGRAM

## 2024-10-17 RX ORDER — ACETAZOLAMIDE 250 MG/1
250 MG TABLET ORAL
Qty: 120 | Refills: 0 | Status: ACTIVE
Start: 2024-10-17

## 2024-10-17 ASSESSMENT — INTRAOCULAR PRESSURE
OD: 16
OS: 15

## 2025-04-03 ENCOUNTER — OFFICE VISIT (OUTPATIENT)
Dept: URBAN - METROPOLITAN AREA CLINIC 56 | Facility: LOCATION | Age: 65
End: 2025-04-03
Payer: COMMERCIAL

## 2025-04-03 DIAGNOSIS — H40.1113 PRIMARY OPEN-ANGLE GLAUCOMA, SEVERE STAGE, RIGHT EYE: Primary | ICD-10-CM

## 2025-04-03 DIAGNOSIS — H40.1121 PRIMARY OPEN-ANGLE GLAUCOMA, MILD STAGE, LEFT EYE: ICD-10-CM

## 2025-04-03 PROCEDURE — 99213 OFFICE O/P EST LOW 20 MIN: CPT | Performed by: STUDENT IN AN ORGANIZED HEALTH CARE EDUCATION/TRAINING PROGRAM

## 2025-04-03 PROCEDURE — 92133 CPTRZD OPH DX IMG PST SGM ON: CPT | Performed by: STUDENT IN AN ORGANIZED HEALTH CARE EDUCATION/TRAINING PROGRAM

## 2025-04-03 RX ORDER — BRIMONIDINE TARTRATE 2 MG/ML
0.2 % SOLUTION/ DROPS OPHTHALMIC
Qty: 10 | Refills: 5 | Status: ACTIVE
Start: 2025-04-03

## 2025-04-03 RX ORDER — ACETAZOLAMIDE 250 MG/1
250 MG TABLET ORAL
Qty: 120 | Refills: 3 | Status: ACTIVE
Start: 2025-04-03

## 2025-04-03 RX ORDER — LATANOPROST 50 UG/ML
0.005 % SOLUTION OPHTHALMIC
Qty: 7.5 | Refills: 1 | Status: ACTIVE
Start: 2025-04-03

## 2025-04-03 RX ORDER — DORZOLAMIDE HYDROCHLORIDE AND TIMOLOL MALEATE 20; 5 MG/ML; MG/ML
SOLUTION/ DROPS OPHTHALMIC
Qty: 10 | Refills: 5 | Status: ACTIVE
Start: 2025-04-03

## 2025-04-03 RX ORDER — ACETAZOLAMIDE 250 MG/1
250 MG TABLET ORAL
Qty: 120 | Refills: 0 | Status: INACTIVE
Start: 2025-04-03 | End: 2025-04-03

## 2025-04-03 ASSESSMENT — INTRAOCULAR PRESSURE
OD: 14
OS: 13

## 2025-08-04 ENCOUNTER — OFFICE VISIT (OUTPATIENT)
Dept: URBAN - METROPOLITAN AREA CLINIC 43 | Facility: CLINIC | Age: 65
End: 2025-08-04
Payer: COMMERCIAL

## 2025-08-04 DIAGNOSIS — H52.4 PRESBYOPIA: ICD-10-CM

## 2025-08-04 DIAGNOSIS — H40.1113 PRIMARY OPEN-ANGLE GLAUCOMA, SEVERE STAGE, RIGHT EYE: Primary | ICD-10-CM

## 2025-08-04 DIAGNOSIS — H26.492 OTHER SECONDARY CATARACT, LEFT EYE: ICD-10-CM

## 2025-08-04 DIAGNOSIS — H43.813 VITREOUS DEGENERATION, BILATERAL: ICD-10-CM

## 2025-08-04 PROCEDURE — 99214 OFFICE O/P EST MOD 30 MIN: CPT | Performed by: OPTOMETRIST

## 2025-08-04 PROCEDURE — 92083 EXTENDED VISUAL FIELD XM: CPT | Performed by: OPTOMETRIST

## 2025-08-04 PROCEDURE — 92133 CPTRZD OPH DX IMG PST SGM ON: CPT | Performed by: OPTOMETRIST

## 2025-08-04 RX ORDER — DORZOLAMIDE HYDROCHLORIDE AND TIMOLOL MALEATE 20; 5 MG/ML; MG/ML
SOLUTION/ DROPS OPHTHALMIC
Qty: 30 | Refills: 2 | Status: ACTIVE
Start: 2025-08-04

## 2025-08-04 RX ORDER — ACETAZOLAMIDE 250 MG/1
250 MG TABLET ORAL
Qty: 120 | Refills: 3 | Status: ACTIVE
Start: 2025-08-04

## 2025-08-04 RX ORDER — LATANOPROST 50 UG/ML
0.005 % SOLUTION OPHTHALMIC
Qty: 7.5 | Refills: 1 | Status: ACTIVE
Start: 2025-08-04

## 2025-08-04 RX ORDER — BRIMONIDINE TARTRATE 2 MG/ML
0.2 % SOLUTION/ DROPS OPHTHALMIC
Qty: 30 | Refills: 2 | Status: ACTIVE
Start: 2025-08-04

## 2025-08-04 RX ORDER — POLYVINYL ALCOHOL, POVIDONE 14; 6 MG/ML; MG/ML
SOLUTION/ DROPS OPHTHALMIC
Qty: 90 | Refills: 12 | Status: ACTIVE
Start: 2025-08-04

## 2025-08-04 ASSESSMENT — VISUAL ACUITY
OD: CF
OS: 20/20

## 2025-08-04 ASSESSMENT — KERATOMETRY
OD: 44.13
OS: 44.00

## 2025-08-04 ASSESSMENT — INTRAOCULAR PRESSURE
OS: 11
OD: 13